# Patient Record
Sex: FEMALE | Race: BLACK OR AFRICAN AMERICAN | NOT HISPANIC OR LATINO | URBAN - METROPOLITAN AREA
[De-identification: names, ages, dates, MRNs, and addresses within clinical notes are randomized per-mention and may not be internally consistent; named-entity substitution may affect disease eponyms.]

---

## 2017-03-15 PROBLEM — 266435005 GASTRO-ESOPHAGEAL REFLUX DISEASE WITHOUT ESOPHAGITIS: Status: ACTIVE | Noted: 2017-03-15

## 2022-04-30 ENCOUNTER — TELEPHONE ENCOUNTER (OUTPATIENT)
Dept: URBAN - METROPOLITAN AREA CLINIC 121 | Facility: CLINIC | Age: 42
End: 2022-04-30

## 2022-05-01 ENCOUNTER — TELEPHONE ENCOUNTER (OUTPATIENT)
Dept: URBAN - METROPOLITAN AREA CLINIC 121 | Facility: CLINIC | Age: 42
End: 2022-05-01

## 2022-05-01 RX ORDER — LANSOPRAZOLE 30 MG/1
1 CAPSULE PO BID CAPSULE, DELAYED RELEASE ORAL
Status: ACTIVE | COMMUNITY
Start: 2017-03-15

## 2024-01-10 ENCOUNTER — DASHBOARD ENCOUNTERS (OUTPATIENT)
Age: 44
End: 2024-01-10

## 2024-01-10 ENCOUNTER — OFFICE VISIT (OUTPATIENT)
Dept: URBAN - METROPOLITAN AREA CLINIC 27 | Facility: CLINIC | Age: 44
End: 2024-01-10
Payer: COMMERCIAL

## 2024-01-10 VITALS
SYSTOLIC BLOOD PRESSURE: 139 MMHG | WEIGHT: 240 LBS | HEART RATE: 73 BPM | HEIGHT: 63 IN | BODY MASS INDEX: 42.52 KG/M2 | DIASTOLIC BLOOD PRESSURE: 103 MMHG

## 2024-01-10 DIAGNOSIS — R14.0 BLOATING: ICD-10-CM

## 2024-01-10 DIAGNOSIS — R10.13 EPIGASTRIC ABDOMINAL PAIN: ICD-10-CM

## 2024-01-10 DIAGNOSIS — R05.3 CHRONIC COUGH: ICD-10-CM

## 2024-01-10 DIAGNOSIS — R11.0 NAUSEA: ICD-10-CM

## 2024-01-10 PROCEDURE — 99204 OFFICE O/P NEW MOD 45 MIN: CPT | Performed by: INTERNAL MEDICINE

## 2024-01-10 RX ORDER — PANTOPRAZOLE SODIUM 40 MG/1
1 TABLET TABLET, DELAYED RELEASE ORAL ONCE A DAY
Qty: 30 | Refills: 3 | OUTPATIENT
Start: 2024-01-10

## 2024-01-10 RX ORDER — LANSOPRAZOLE 30 MG/1
1 CAPSULE PO BID CAPSULE, DELAYED RELEASE ORAL
Status: ACTIVE | COMMUNITY
Start: 2017-03-15

## 2024-01-10 NOTE — HPI-TODAY'S VISIT:
Ms. Garcia is a 43-year-old woman who is here for evaluation of nausea and vomiting.  She has a chronic cough for at least 7 years based on old notes, but she states this recent episode started 3 weeks ago.  She states that's she thinks she is nauseated from swallowing phlegm, she has mild bloating. She states her cough can  be worse at night when she lays down.    She has been given a PPI in the past, and she admits to not taking it like she should, so she cannot say if it has helped.  She does have hoarseness.

## 2024-01-17 ENCOUNTER — OFFICE VISIT (OUTPATIENT)
Dept: URBAN - METROPOLITAN AREA CLINIC 29 | Facility: CLINIC | Age: 44
End: 2024-01-17

## 2024-01-19 LAB — H PYLORI BREATH TEST: NOT DETECTED

## 2024-02-21 ENCOUNTER — OV EP (OUTPATIENT)
Dept: URBAN - METROPOLITAN AREA CLINIC 27 | Facility: CLINIC | Age: 44
End: 2024-02-21